# Patient Record
Sex: FEMALE | Race: WHITE
[De-identification: names, ages, dates, MRNs, and addresses within clinical notes are randomized per-mention and may not be internally consistent; named-entity substitution may affect disease eponyms.]

---

## 2017-03-17 ENCOUNTER — HOSPITAL ENCOUNTER (OUTPATIENT)
Dept: HOSPITAL 47 - RADMAMWWP | Age: 52
Discharge: HOME | End: 2017-03-17
Payer: COMMERCIAL

## 2017-03-17 DIAGNOSIS — R59.0: ICD-10-CM

## 2017-03-17 DIAGNOSIS — Z08: Primary | ICD-10-CM

## 2017-03-17 DIAGNOSIS — Z85.3: ICD-10-CM

## 2017-03-17 PROCEDURE — 76536 US EXAM OF HEAD AND NECK: CPT

## 2017-03-18 NOTE — US
EXAMINATION TYPE: US thyroid st tissue head/neck

 

DATE OF EXAM: 3/17/2017 3:56 PM

 

COMPARISON: NONE

 

CLINICAL HISTORY:  R59.0 Loc enlarged lymph nodes. Bilateral anterior neck palpable areas.  

 

 

 

TECHNOLOGIST IMPRESSION:  Bilateral neck scanned at area of palpable masses.  On both sides, multiple
 vascular hypoechoic lesions seen.  Largest measured.  Probable lymph nodes. 

 

Right-

1- 1.1 x 0.6 x 0.3 cm

2- 1.0 x 0.7 x 0.4 cm

 

Left-

1- 1.3 x 0.8 x 0.7 cm

2- 0.7 x 0.4 x 0.3 cm

3- 1.9 x 1.1 x 0.5 cm

 

 

Images of the bilateral neck show prominent but benign-appearing oval well-circumscribed lymph nodes 
that appear to retain normal central hyperechoic area or fatty hilum. No lymph nodes greater than 1 c
m in short axis are clearly seen. No worrisome fluid collection is noted.

 

IMPRESSION:  Benign-appearing lymph nodes documented by technologist. No suspicious mass or adenopath
y is clearly identified on images saved.

## 2017-03-20 NOTE — MM
Reason for exam: history of breast cancer, mastectomy.



History:

Patient is postmenopausal and is nulliparous.

Mastectomy of the right breast, 2005.

Took antineoplastic for 1 year 6 months beginning at age 40.



Physical Findings:

Nurse did not find any significant physical abnormalities on exam.



MG Diagnostic Mammo LT w CAD

CC and MLO view(s) were taken of the left breast.

The breast tissue is heterogeneously dense. This may lower the sensitivity of 

mammography.  No significant new findings when compared with previous films. 



These results were verbally communicated with the patient and result sheet given 

to the patient on 3/17/17.





ASSESSMENT: Benign, BI-RAD 2



RECOMMENDATION:

Follow-up diagnostic mammogram of the left breast in 1 year.

## 2018-06-28 ENCOUNTER — HOSPITAL ENCOUNTER (OUTPATIENT)
Dept: HOSPITAL 47 - RADMAMWWP | Age: 53
Discharge: HOME | End: 2018-06-28
Payer: COMMERCIAL

## 2018-06-28 DIAGNOSIS — Z08: Primary | ICD-10-CM

## 2018-06-28 DIAGNOSIS — Z85.3: ICD-10-CM

## 2018-06-28 DIAGNOSIS — R92.8: ICD-10-CM

## 2018-06-28 PROCEDURE — 77061 BREAST TOMOSYNTHESIS UNI: CPT

## 2018-06-28 PROCEDURE — 77065 DX MAMMO INCL CAD UNI: CPT

## 2018-06-28 NOTE — MM
Reason for exam: additional evaluation requested from prior study.

Last mammogram was performed 1 year and 3 months ago.



History:

Patient is postmenopausal, has history of breast cancer at age 39, and is 

nulliparous.

Mastectomy of the right breast, 2005.

Took antineoplastic for 1 year 6 months beginning at age 40.



Physical Findings:

Nurse Summary: 1 x 1cm nodule in the right breast outer aspect mastectomy scar and

a 1 x 1.5cm nodule in the left breast at 1 o'clock upper outer quadrant (nurse 

ts).



MG 3D Diag Mammo W/Cad LT

CC and MLO view(s) were taken of the left breast.

Prior study comparison: March 17, 2017, left breast MG diagnostic mammo LT w CAD.

The breast tissue is heterogeneously dense. This may lower the sensitivity of 

mammography.  There is no discrete abnormality including area of concern.

No significant new findings when compared with previous films.



These results were verbally communicated with the patient and result sheet given 

to the patient on 6/28/18.





ASSESSMENT: Incomplete: need additional imaging evaluation, BI-RAD 0



RECOMMENDATION:

Ultrasound of both breasts.

## 2018-06-28 NOTE — USB
Reason for exam: additional evaluation requested from abnormal screening.



History:

Patient is postmenopausal, has history of breast cancer at age 39, and is 

nulliparous.

Mastectomy of the right breast, 2005.

Took antineoplastic for 1 year 6 months beginning at age 40.



US Breast Limited BILAT

Right limited breast ultrasound including focal area of concern, retroareolar and 

axilla demonstrates no cystic or solid lesion seen.



Left limited breast ultrasound including focal area of concern, retroareolar and 

axilla demonstrates no cystic or solid lesion seen. Left 1 o'clock palpable 

appears to be dense tissue.



These results were verbally communicated with the patient and result sheet given 

to the patient on 6/28/18.





ASSESSMENT: Negative, BI-RAD 1



RECOMMENDATION:

Follow-up diagnostic mammogram of the left breast in 1 year.

## 2020-09-09 ENCOUNTER — HOSPITAL ENCOUNTER (OUTPATIENT)
Dept: HOSPITAL 47 - RADMAMWWP | Age: 55
Discharge: HOME | End: 2020-09-09
Attending: FAMILY MEDICINE
Payer: COMMERCIAL

## 2020-09-09 DIAGNOSIS — R92.8: Primary | ICD-10-CM

## 2020-09-09 PROCEDURE — 77065 DX MAMMO INCL CAD UNI: CPT

## 2020-09-09 PROCEDURE — 77061 BREAST TOMOSYNTHESIS UNI: CPT

## 2020-09-09 NOTE — MM
Reason for exam: additional evaluation requested from prior study.

Last mammogram was performed 2 years and 2 months ago.



History:

Patient is postmenopausal, has history of breast cancer at age 39, and is 

nulliparous.

Family history of breast cancer in paternal aunt.

Mastectomy of the right breast, 2005.

Took antineoplastic for 1 year 6 months beginning at age 40.



Physical Findings:

Nurse did not find any significant physical abnormalities on exam.



MG 3D Diag Mammo W/Cad LT

CC, MLO, spot compression MLO, and spot compression CC view(s) were taken of the 

left breast.

Prior study comparison: June 28, 2018, left breast MG 3d diag mammo w/cad LT.  

March 17, 2017, left breast MG diagnostic mammo LT w CAD.

The breast tissue is heterogeneously dense. This may lower the sensitivity of 

mammography.  Subtle asymmetric density posterior upper outer quadrant maybe 

slightly more pronounced. Appearance is unchanged on spot views. Ultrasound 

recommended.



These results were verbally communicated with the patient and result sheet given 

to the patient on 9/9/20.





ASSESSMENT: Incomplete: need additional imaging evaluation, BI-RAD 0



RECOMMENDATION:

Ultrasound of the left breast.

(upper outer quadant)

## 2020-09-09 NOTE — USB
Reason for exam: additional evaluation requested from abnormal screening.



History:

Patient is postmenopausal, has history of breast cancer at age 39, and is 

nulliparous.

Family history of breast cancer in paternal aunt.

Mastectomy of the right breast, 2005.

Took antineoplastic for 1 year 6 months beginning at age 40.



US Breast Limited LT

Left limited breast ultrasound including focal area of concern, retroareolar and 

axilla demonstrates no cystic or solid lesion seen. Scanned 12-3 o'clock. 

Precautionary 6 month follow up mammogram.



These results were verbally communicated with the patient and result sheet given 

to the patient on 9/9/20.





ASSESSMENT: Probably benign, BI-RAD 3



RECOMMENDATION:

Follow-up diagnostic mammogram of the left breast in 6 months.

## 2021-04-09 ENCOUNTER — HOSPITAL ENCOUNTER (OUTPATIENT)
Dept: HOSPITAL 47 - RADMAMWWP | Age: 56
Discharge: HOME | End: 2021-04-09
Attending: FAMILY MEDICINE
Payer: COMMERCIAL

## 2021-04-09 DIAGNOSIS — Z85.3: ICD-10-CM

## 2021-04-09 DIAGNOSIS — Z78.0: Primary | ICD-10-CM

## 2021-04-09 DIAGNOSIS — Z80.3: ICD-10-CM

## 2021-04-09 PROCEDURE — 77061 BREAST TOMOSYNTHESIS UNI: CPT

## 2021-04-09 PROCEDURE — 77065 DX MAMMO INCL CAD UNI: CPT

## 2021-04-12 NOTE — MM
Reason for exam: follow-up at short interval from prior study.

Last mammogram was performed 7 months ago.



History:

Patient is postmenopausal, has history of breast cancer at age 39, and is 

nulliparous.

Family history of breast cancer in paternal aunt.

Mastectomy of the right breast, 2005.

Took antineoplastic for 1 year 6 months beginning at age 40.



Physical Findings:

Nurse did not find any significant physical abnormalities on exam.



MG 3D Diag Mammo W/Cad LT

CC and MLO view(s) were taken of the left breast.

Prior study comparison: September 9, 2020, left breast MG 3d diag mammo w/cad LT. 

June 28, 2018, left breast MG 3d diag mammo w/cad LT.

The breast tissue is heterogeneously dense. This may lower the sensitivity of 

mammography.  Superior asymmetric density on 3D diserses on spot 3D.

No significant new findings when compared with previous films.



These results were verbally communicated with the patient and result sheet given 

to the patient on 4/9/21.





ASSESSMENT: Benign, BI-RAD 2



RECOMMENDATION:

Follow-up diagnostic mammogram of the left breast in 1 year.

## 2022-05-09 ENCOUNTER — HOSPITAL ENCOUNTER (OUTPATIENT)
Dept: HOSPITAL 47 - RADMAMWWP | Age: 57
Discharge: HOME | End: 2022-05-09
Attending: FAMILY MEDICINE
Payer: COMMERCIAL

## 2022-05-09 DIAGNOSIS — Z85.3: ICD-10-CM

## 2022-05-09 DIAGNOSIS — Z78.0: ICD-10-CM

## 2022-05-09 DIAGNOSIS — Z80.3: ICD-10-CM

## 2022-05-09 DIAGNOSIS — R92.8: Primary | ICD-10-CM

## 2022-05-09 DIAGNOSIS — Z90.11: ICD-10-CM

## 2022-05-09 PROCEDURE — 77065 DX MAMMO INCL CAD UNI: CPT

## 2022-05-09 PROCEDURE — 77061 BREAST TOMOSYNTHESIS UNI: CPT

## 2022-05-09 NOTE — MM
Reason for exam: additional evaluation requested from prior study.

Last mammogram was performed 1 year and 1 month ago.



History:

Patient is postmenopausal, has history of breast cancer at age 39, and is 

nulliparous.

Family history of breast cancer in paternal aunt.

Mastectomy of the right breast, 2005.

Took antineoplastic for 1 year 6 months beginning at age 40.



Physical Findings:

A clinical breast exam by your physician is recommended on an annual basis and 

results should be correlated with mammographic findings.



MG 3D Diag Mammo W/Cad LT

CC and MLO view(s) were taken of the left breast.

Prior study comparison: April 9, 2021, left breast MG 3d diag mammo w/cad LT.  

September 9, 2020, left breast MG 3d diag mammo w/cad LT.

The breast tissue is heterogeneously dense. This may lower the sensitivity of 

mammography.  There is no discrete abnormality.



Results were given to the patient verbally at the time of the exam.





ASSESSMENT: Negative, BI-RAD 1



RECOMMENDATION:

Routine screening mammogram of the left breast in 1 year.

## 2022-09-27 ENCOUNTER — HOSPITAL ENCOUNTER (OUTPATIENT)
Dept: HOSPITAL 47 - RADXRMAIN | Age: 57
Discharge: HOME | End: 2022-09-27
Attending: FAMILY MEDICINE
Payer: COMMERCIAL

## 2022-09-27 DIAGNOSIS — M25.551: Primary | ICD-10-CM

## 2022-09-27 PROCEDURE — 73502 X-RAY EXAM HIP UNI 2-3 VIEWS: CPT

## 2022-09-27 NOTE — XR
EXAMINATION TYPE: XR Hip RT and AP Pelvis

 

DATE OF EXAM: 9/27/2022

 

CLINICAL HISTORY: pain

 

TECHNIQUE:  AP and frogleg views of the right hip are obtained. Single view of the pelvis is also sub
mitted.

 

COMPARISON: None.

 

FINDINGS:  There is no acute fracture/dislocation evident.  The joint space  appears mildly narrowed.
  The overlying soft tissue appears unremarkable.

 

IMPRESSION:  

1.  There is no acute fracture or dislocation.

 

ICD 10 NO FRACTURE, INITIAL EVALUATION

## 2023-07-28 ENCOUNTER — HOSPITAL ENCOUNTER (OUTPATIENT)
Dept: HOSPITAL 47 - RADMAMWWP | Age: 58
Discharge: HOME | End: 2023-07-28
Attending: FAMILY MEDICINE
Payer: COMMERCIAL

## 2023-07-28 DIAGNOSIS — Z12.31: Primary | ICD-10-CM

## 2023-07-28 DIAGNOSIS — Z85.3: ICD-10-CM

## 2023-07-28 DIAGNOSIS — Z80.3: ICD-10-CM

## 2023-07-28 DIAGNOSIS — Z78.0: ICD-10-CM

## 2023-07-28 DIAGNOSIS — Z90.11: ICD-10-CM

## 2023-07-28 PROCEDURE — 77067 SCR MAMMO BI INCL CAD: CPT

## 2023-07-31 NOTE — MM
Reason for Exam: Screening  (asymptomatic). 

Last mammogram was performed 1 year(s) and 2 month(s) ago. 





Patient History: 

Menarche at age 12. Patient has no children. Hysterectomy at age 46. Postmenopausal. Breast cancer,

right, age 39. 2005, Mastectomy on the Right side.

Paternal aunt had breast cancer. 





Prior Study Comparison: 

9/9/2020 Left Diagnostic Mammogram, [facility]. 4/9/2021 Left Diagnostic Mammogram, [facility]. 5/9/2022 Left

Diagnostic Mammogram, [facility]. 





Tissue Density: 

Left: The breast tissue is heterogeneously dense. This may lower the sensitivity of mammography.





Findings: 

Analyzed By CAD. 

Pattern appears stable.

No suspicious groups of microcalcifications, spiculated or lobular masses, architectural distortion

or other secondary signs of malignancy are mammographically apparent. 





Overall Assessment: Benign, BI-RAD 2





Management: 

Screening Mammogram of the left breast in 1 year.

A negative mammogram report should not preclude additional follow up of suspicious palpable

abnormalities.

Patient should continue monthly self breast exam.

A clinical breast exam by your physician is recommended on an annual basis and results should be

correlated with mammographic findings.



Electronically signed and approved by: Enrico Greco D.O. Radiologis

## 2024-09-10 ENCOUNTER — HOSPITAL ENCOUNTER (OUTPATIENT)
Dept: HOSPITAL 47 - RADMAMWWP | Age: 59
Discharge: HOME | End: 2024-09-10
Attending: OBSTETRICS & GYNECOLOGY
Payer: COMMERCIAL

## 2024-09-10 PROCEDURE — 77067 SCR MAMMO BI INCL CAD: CPT

## 2024-09-11 NOTE — MM
Reason for Exam: Screening  (asymptomatic). 

Last mammogram was performed 1 year(s) and 2 month(s) ago. 





Patient History: 

Menarche at age 12. Patient has no children. Hysterectomy at age 46. Postmenopausal. Breast cancer,

right, age 39. 2005, Mastectomy on the Right side.

Paternal aunt had breast cancer. 





Prior Study Comparison: 

4/9/2021 Left Diagnostic Mammogram, [facility]. 5/9/2022 Left Diagnostic Mammogram, [facility]. 7/28/2023 Left MG

3D scr kesha unilateral w/cad., [facility]. 





Tissue Density: 

Left: The breasts are heterogeneously dense, which may obscure small masses.





Findings: 

No evidence for mass or distortion left breast. No suspicious microcalcifications present. 





Overall Assessment: Negative, BI-RAD 1





Management: 

Screening Mammogram of the left breast in 1 year.

.



Patient should continue monthly self-breast exams.  A clinical breast exam by your physician is

recommended on an annual basis.

This exam should not preclude additional follow-up of suspicious palpable abnormalities.



Note on Georgie scores and lifetime risk:

1. A Georgie score greater than 3% is considered moderate risk. If this is the case, consider

specialist referral to assess eligibility for a risk reducing agent.

2. If overall lifetime risk for the development of breast cancer is 20% or higher, the patient may

qualify for future screening with alternating mammogram and breast MRI.



Electronically signed and approved by: Leopold M. Fregoli, M.D. Radiologis